# Patient Record
Sex: FEMALE | Race: WHITE | NOT HISPANIC OR LATINO | Employment: UNEMPLOYED | URBAN - METROPOLITAN AREA
[De-identification: names, ages, dates, MRNs, and addresses within clinical notes are randomized per-mention and may not be internally consistent; named-entity substitution may affect disease eponyms.]

---

## 2023-01-27 PROBLEM — M35.00 SJOGREN'S SYNDROME (MULTI): Status: ACTIVE | Noted: 2023-01-27

## 2023-01-27 PROBLEM — R05.9 COUGH: Status: ACTIVE | Noted: 2023-01-27

## 2023-01-27 PROBLEM — R07.89 CHEST PAIN, ATYPICAL: Status: ACTIVE | Noted: 2023-01-27

## 2023-01-27 PROBLEM — O09.899 SHORT INTERVAL BETWEEN PREGNANCIES AFFECTING PREGNANCY, ANTEPARTUM (HHS-HCC): Status: ACTIVE | Noted: 2023-01-27

## 2023-01-27 PROBLEM — N17.9 ACUTE KIDNEY INJURY (CMS-HCC): Status: ACTIVE | Noted: 2023-01-27

## 2023-01-27 PROBLEM — R00.0 TACHYCARDIA: Status: ACTIVE | Noted: 2023-01-27

## 2023-01-27 PROBLEM — N39.0 ACUTE UTI: Status: ACTIVE | Noted: 2023-01-27

## 2023-01-27 PROBLEM — M79.7 FIBROMYALGIA: Status: ACTIVE | Noted: 2023-01-27

## 2023-01-27 PROBLEM — D51.9 ANEMIA, B12 DEFICIENCY: Status: ACTIVE | Noted: 2023-01-27

## 2023-01-27 PROBLEM — R11.0 NAUSEA: Status: ACTIVE | Noted: 2023-01-27

## 2023-01-27 PROBLEM — F41.9 ANXIETY: Status: ACTIVE | Noted: 2023-01-27

## 2023-01-27 PROBLEM — K21.9 GERD WITHOUT ESOPHAGITIS: Status: ACTIVE | Noted: 2023-01-27

## 2023-01-27 PROBLEM — J01.90 ACUTE SINUSITIS: Status: ACTIVE | Noted: 2023-01-27

## 2023-01-27 PROBLEM — H10.9 CONJUNCTIVITIS: Status: ACTIVE | Noted: 2023-01-27

## 2023-01-27 PROBLEM — E55.9 VITAMIN D DEFICIENCY: Status: ACTIVE | Noted: 2023-01-27

## 2023-01-27 PROBLEM — M32.9 SYSTEMIC LUPUS ERYTHEMATOSUS (MULTI): Status: ACTIVE | Noted: 2023-01-27

## 2023-01-27 PROBLEM — R35.0 URINARY FREQUENCY: Status: ACTIVE | Noted: 2023-01-27

## 2023-01-27 PROBLEM — G89.29 CHRONIC PAIN: Status: ACTIVE | Noted: 2023-01-27

## 2023-01-27 PROBLEM — G47.00 INSOMNIA: Status: ACTIVE | Noted: 2023-01-27

## 2023-01-27 PROBLEM — I10 ESSENTIAL HYPERTENSION: Status: ACTIVE | Noted: 2023-01-27

## 2023-01-27 PROBLEM — R76.8 SS-A ANTIBODY POSITIVE: Status: ACTIVE | Noted: 2023-01-27

## 2023-01-27 PROBLEM — R51.9 HEADACHE: Status: ACTIVE | Noted: 2023-01-27

## 2023-01-27 PROBLEM — T56.891A: Status: ACTIVE | Noted: 2023-01-27

## 2023-01-27 PROBLEM — G90.A POTS (POSTURAL ORTHOSTATIC TACHYCARDIA SYNDROME): Status: ACTIVE | Noted: 2023-01-27

## 2023-01-27 PROBLEM — G43.009 MIGRAINE WITHOUT AURA AND WITHOUT STATUS MIGRAINOSUS, NOT INTRACTABLE: Status: ACTIVE | Noted: 2023-01-27

## 2023-01-27 PROBLEM — N28.9 RENAL INSUFFICIENCY: Status: ACTIVE | Noted: 2023-01-27

## 2023-01-27 RX ORDER — GABAPENTIN 300 MG/1
300 CAPSULE ORAL DAILY PRN
COMMUNITY
End: 2023-03-09 | Stop reason: SDUPTHER

## 2023-03-09 ENCOUNTER — OFFICE VISIT (OUTPATIENT)
Dept: PRIMARY CARE | Facility: CLINIC | Age: 38
End: 2023-03-09
Payer: COMMERCIAL

## 2023-03-09 VITALS
DIASTOLIC BLOOD PRESSURE: 90 MMHG | HEIGHT: 72 IN | RESPIRATION RATE: 18 BRPM | OXYGEN SATURATION: 98 % | SYSTOLIC BLOOD PRESSURE: 146 MMHG | WEIGHT: 211.6 LBS | HEART RATE: 99 BPM | BODY MASS INDEX: 28.66 KG/M2

## 2023-03-09 DIAGNOSIS — I10 ESSENTIAL HYPERTENSION: ICD-10-CM

## 2023-03-09 DIAGNOSIS — Z00.00 ROUTINE GENERAL MEDICAL EXAMINATION AT A HEALTH CARE FACILITY: Primary | ICD-10-CM

## 2023-03-09 DIAGNOSIS — M35.00 SJOGREN'S SYNDROME, WITH UNSPECIFIED ORGAN INVOLVEMENT (MULTI): ICD-10-CM

## 2023-03-09 DIAGNOSIS — G90.A POTS (POSTURAL ORTHOSTATIC TACHYCARDIA SYNDROME): ICD-10-CM

## 2023-03-09 DIAGNOSIS — Z13.6 SCREENING FOR CARDIOVASCULAR CONDITION: ICD-10-CM

## 2023-03-09 DIAGNOSIS — M79.7 FIBROMYALGIA: ICD-10-CM

## 2023-03-09 PROBLEM — R11.0 NAUSEA: Status: RESOLVED | Noted: 2023-01-27 | Resolved: 2023-03-09

## 2023-03-09 PROBLEM — N39.0 ACUTE UTI: Status: RESOLVED | Noted: 2023-01-27 | Resolved: 2023-03-09

## 2023-03-09 PROBLEM — K21.9 GERD WITHOUT ESOPHAGITIS: Status: RESOLVED | Noted: 2023-01-27 | Resolved: 2023-03-09

## 2023-03-09 PROBLEM — R51.9 HEADACHE: Status: RESOLVED | Noted: 2023-01-27 | Resolved: 2023-03-09

## 2023-03-09 PROBLEM — H10.9 CONJUNCTIVITIS: Status: RESOLVED | Noted: 2023-01-27 | Resolved: 2023-03-09

## 2023-03-09 PROBLEM — J01.90 ACUTE SINUSITIS: Status: RESOLVED | Noted: 2023-01-27 | Resolved: 2023-03-09

## 2023-03-09 PROBLEM — R00.0 TACHYCARDIA: Status: RESOLVED | Noted: 2023-01-27 | Resolved: 2023-03-09

## 2023-03-09 PROBLEM — O09.899 SHORT INTERVAL BETWEEN PREGNANCIES AFFECTING PREGNANCY, ANTEPARTUM (HHS-HCC): Status: RESOLVED | Noted: 2023-01-27 | Resolved: 2023-03-09

## 2023-03-09 PROBLEM — R35.0 URINARY FREQUENCY: Status: RESOLVED | Noted: 2023-01-27 | Resolved: 2023-03-09

## 2023-03-09 PROBLEM — R05.9 COUGH: Status: RESOLVED | Noted: 2023-01-27 | Resolved: 2023-03-09

## 2023-03-09 PROBLEM — R07.89 CHEST PAIN, ATYPICAL: Status: RESOLVED | Noted: 2023-01-27 | Resolved: 2023-03-09

## 2023-03-09 PROBLEM — N28.9 RENAL INSUFFICIENCY: Status: RESOLVED | Noted: 2023-01-27 | Resolved: 2023-03-09

## 2023-03-09 PROBLEM — N17.9 ACUTE KIDNEY INJURY (CMS-HCC): Status: RESOLVED | Noted: 2023-01-27 | Resolved: 2023-03-09

## 2023-03-09 PROCEDURE — 3080F DIAST BP >= 90 MM HG: CPT | Performed by: NURSE PRACTITIONER

## 2023-03-09 PROCEDURE — 1036F TOBACCO NON-USER: CPT | Performed by: NURSE PRACTITIONER

## 2023-03-09 PROCEDURE — 3077F SYST BP >= 140 MM HG: CPT | Performed by: NURSE PRACTITIONER

## 2023-03-09 PROCEDURE — 99385 PREV VISIT NEW AGE 18-39: CPT | Performed by: NURSE PRACTITIONER

## 2023-03-09 PROCEDURE — 99213 OFFICE O/P EST LOW 20 MIN: CPT | Performed by: NURSE PRACTITIONER

## 2023-03-09 RX ORDER — GABAPENTIN 300 MG/1
300 CAPSULE ORAL DAILY PRN
Qty: 30 CAPSULE | Refills: 1 | Status: SHIPPED | OUTPATIENT
Start: 2023-03-09 | End: 2023-05-08

## 2023-03-09 RX ORDER — DILTIAZEM HYDROCHLORIDE 120 MG/1
120 CAPSULE, COATED, EXTENDED RELEASE ORAL DAILY
Qty: 30 CAPSULE | Refills: 5 | Status: SHIPPED | OUTPATIENT
Start: 2023-03-09 | End: 2023-09-05

## 2023-03-09 NOTE — ASSESSMENT & PLAN NOTE
R/T POTS  Was on medication more than 2years ago but did stop prior to pregnancy and has not taken since  Was fatigued with metoprolol and would then have bradycardia with POTS flare  Start diltiazem daily  Discussed elevated BP today and at previous PCP visits   Follow up in 2-3 months for BP check

## 2023-03-09 NOTE — PATIENT INSTRUCTIONS
Start taking dilatzem daily - will help with palpitations and your blood pressure    Have labs completed  Your Pap is up to date     I refilled your gabapentin     Follow up in 4 months for BP check

## 2023-03-09 NOTE — PROGRESS NOTES
"Subjective   Patient ID: Randi Pugh is a 37 y.o. female who presents for Follow-up (NP.OV here today to est. States she has had a CPE within the last year, no concerns at this time. ).    SLE  POTS   Migraines  HTN  Fibromyalgia - taking gabapentin     Has not been taking medication for BP.  Will feel like BP gets elevated at night at times.  She will have tachycardia and will have bradycardia as well.  Had more bradycardia with metoprolol.  Metoprolol was not controlling BP      - both full term both girls  Full time mother  She does not smoke  Pap 2 years ago           Review of Systems   All other systems reviewed and are negative.      Objective   /90   Pulse 99   Resp 18   Ht 1.88 m (6' 2\")   Wt 96 kg (211 lb 9.6 oz)   LMP 2023 (Exact Date)   SpO2 98%   BMI 27.17 kg/m²     Physical Exam  Vitals and nursing note reviewed.   Constitutional:       Appearance: Normal appearance.   HENT:      Head: Normocephalic and atraumatic.      Right Ear: Hearing, tympanic membrane, ear canal and external ear normal.      Left Ear: Hearing, tympanic membrane, ear canal and external ear normal.      Nose: Nose normal.      Mouth/Throat:      Lips: Pink.      Mouth: Mucous membranes are moist.   Eyes:      General: Lids are normal.      Extraocular Movements: Extraocular movements intact.      Conjunctiva/sclera: Conjunctivae normal.      Pupils: Pupils are equal, round, and reactive to light.   Cardiovascular:      Rate and Rhythm: Normal rate and regular rhythm.      Pulses: Normal pulses.      Heart sounds: Normal heart sounds.   Pulmonary:      Effort: Pulmonary effort is normal.      Breath sounds: Normal breath sounds.   Abdominal:      General: Bowel sounds are normal.      Palpations: Abdomen is soft.      Tenderness: There is no abdominal tenderness.   Musculoskeletal:         General: Normal range of motion.      Cervical back: Normal range of motion and neck supple.   Lymphadenopathy:      " Cervical: No cervical adenopathy.   Skin:     General: Skin is warm and dry.      Capillary Refill: Capillary refill takes less than 2 seconds.   Neurological:      General: No focal deficit present.      Mental Status: She is oriented to person, place, and time.   Psychiatric:         Mood and Affect: Mood normal.         Behavior: Behavior normal.         Thought Content: Thought content normal.         Judgment: Judgment normal.         Assessment/Plan   Problem List Items Addressed This Visit          Circulatory    Essential hypertension     R/T POTS  Was on medication more than 10 years ago but did stop prior to pregnancy and has not taken since         Relevant Medications    dilTIAZem CD (Cardizem CD) 120 mg 24 hr capsule    POTS (postural orthostatic tachycardia syndrome)    Relevant Medications    dilTIAZem CD (Cardizem CD) 120 mg 24 hr capsule       Musculoskeletal    Fibromyalgia    Relevant Medications    gabapentin (Neurontin) 300 mg capsule       Other    Sjogren's syndrome (CMS/HCC)     Has seen rheumatology in the past  Has not seen in the past 6 months         Routine general medical examination at a health care facility - Primary     Health Maintenance  - CBC, CMP, Lipid, tsh , vit b, vit d  -  declines vaccines   -  Pap UTD  -  eat a diet high in lean protein, vegetable, fruits, and low in carbohydrates  -  exercise 20-30 minutes 4-5 days a week           Relevant Orders    CBC    Comprehensive Metabolic Panel    TSH with reflex to Free T4 if abnormal    Vitamin B12    Vitamin D 1,25 Dihydroxy     Other Visit Diagnoses       Screening for cardiovascular condition        Relevant Orders    Lipid Panel

## 2023-03-11 PROBLEM — Z00.00 ROUTINE GENERAL MEDICAL EXAMINATION AT A HEALTH CARE FACILITY: Status: ACTIVE | Noted: 2023-03-11

## 2023-03-12 NOTE — ASSESSMENT & PLAN NOTE
Health Maintenance  - CBC, CMP, Lipid, tsh , vit b, vit d  -  declines vaccines   -  Pap UTD  -  eat a diet high in lean protein, vegetable, fruits, and low in carbohydrates  -  exercise 20-30 minutes 4-5 days a week